# Patient Record
(demographics unavailable — no encounter records)

---

## 2024-12-03 NOTE — HISTORY OF PRESENT ILLNESS
[FreeTextEntry1] : pt comes for annual exam . She has gallstones diagnosed by ultrasound . According to the patient there are many . She has had some pain on and off but no vomiting .